# Patient Record
Sex: MALE | Race: WHITE | HISPANIC OR LATINO | Employment: FULL TIME | URBAN - METROPOLITAN AREA
[De-identification: names, ages, dates, MRNs, and addresses within clinical notes are randomized per-mention and may not be internally consistent; named-entity substitution may affect disease eponyms.]

---

## 2023-06-01 ENCOUNTER — CLINICAL SUPPORT (OUTPATIENT)
Dept: URGENT CARE | Facility: CLINIC | Age: 43
End: 2023-06-01
Payer: MEDICAID

## 2023-06-01 VITALS
DIASTOLIC BLOOD PRESSURE: 85 MMHG | HEART RATE: 77 BPM | WEIGHT: 235 LBS | HEIGHT: 69 IN | SYSTOLIC BLOOD PRESSURE: 165 MMHG | RESPIRATION RATE: 18 BRPM | BODY MASS INDEX: 34.8 KG/M2 | TEMPERATURE: 98 F | OXYGEN SATURATION: 98 %

## 2023-06-01 DIAGNOSIS — Z76.89 REFERRAL OF PATIENT: Primary | ICD-10-CM

## 2023-06-01 DIAGNOSIS — B35.6 JOCK ITCH: ICD-10-CM

## 2023-06-01 PROCEDURE — 99214 OFFICE O/P EST MOD 30 MIN: CPT | Mod: PBBFAC

## 2023-06-01 PROCEDURE — 99203 OFFICE O/P NEW LOW 30 MIN: CPT | Mod: S$PBB,,,

## 2023-06-01 PROCEDURE — 99203 PR OFFICE/OUTPT VISIT, NEW, LEVL III, 30-44 MIN: ICD-10-PCS | Mod: S$PBB,,,

## 2023-06-01 RX ORDER — NYSTATIN 100000 [USP'U]/G
POWDER TOPICAL 2 TIMES DAILY
Qty: 60 G | Refills: 0 | Status: SHIPPED | OUTPATIENT
Start: 2023-06-01

## 2023-06-02 NOTE — PROGRESS NOTES
"Subjective:      Patient ID: Rubio Cheek is a 43 y.o. male.    Vitals:  height is 5' 9" (1.753 m) and weight is 106.6 kg (235 lb). His oral temperature is 97.5 °F (36.4 °C). His blood pressure is 165/85 (abnormal) and his pulse is 77. His respiration is 18 and oxygen saturation is 98%.     Chief Complaint: Dizziness (Over 1 month), Fatigue (Over 1 month), and Referral (Referral for PCP)    PT states dizziness and fatigue for the past month. Works as an  in the heat all day. Denies chest pain, SOB or syncope. States some nausea and decreased appetite. Hx of HTN but has not been on BP medication in 3 years, needs referral to PCP. PT also states having a rash in her groin that itches from sweating.      Constitution: Positive for fatigue.   Neck: neck negative.   Cardiovascular: Negative.    Eyes: Negative.    Respiratory: Negative.     Gastrointestinal: Negative.    Genitourinary: Negative.         States pruritic red, rash in groin. Declined examination via female provider.   Musculoskeletal: Negative.    Skin: Negative.    Neurological:  Positive for dizziness.    Objective:     Physical Exam   Constitutional: He is oriented to person, place, and time.   HENT:   Head: Normocephalic.   Ears:   Right Ear: External ear and ear canal normal. impacted cerumen  Left Ear: External ear and ear canal normal. impacted cerumen  Nose: Nose normal.   Mouth/Throat: Uvula is midline, oropharynx is clear and moist and mucous membranes are normal. Mucous membranes are moist. Oropharynx is clear.   Eyes: Pupils are equal, round, and reactive to light.   Cardiovascular: Normal rate, regular rhythm, normal heart sounds and normal pulses.   Pulmonary/Chest: Effort normal and breath sounds normal.   Abdominal: Normal appearance. Soft.   Musculoskeletal: Normal range of motion.         General: Normal range of motion.   Neurological: He is alert and oriented to person, place, and time.   Skin: Skin is warm and dry. "   Vitals reviewed.    Assessment:     1. Referral of patient    2. Zachariah itch        Plan:       Referral of patient  -     Ambulatory referral/consult to Family Practice    Jock itch  -     nystatin (MYCOSTATIN) powder; Apply topically 2 (two) times daily.  Dispense: 60 g; Refill: 0    Stay hydrated with plenty of water and electrolyte beverages.    Take breaks in air condition often.    ER precautions given, patient verbalized understanding.     Please see provided patient education for guidance.    Follow up with PCP or return to clinic if symptoms worsen or do not improve.

## 2023-08-14 ENCOUNTER — OFFICE VISIT (OUTPATIENT)
Dept: FAMILY MEDICINE | Facility: CLINIC | Age: 43
End: 2023-08-14
Payer: MEDICAID

## 2023-08-14 VITALS
TEMPERATURE: 99 F | OXYGEN SATURATION: 99 % | SYSTOLIC BLOOD PRESSURE: 136 MMHG | HEIGHT: 70 IN | HEART RATE: 77 BPM | DIASTOLIC BLOOD PRESSURE: 93 MMHG | RESPIRATION RATE: 18 BRPM | WEIGHT: 229 LBS | BODY MASS INDEX: 32.78 KG/M2

## 2023-08-14 DIAGNOSIS — I10 PRIMARY HYPERTENSION: Primary | ICD-10-CM

## 2023-08-14 DIAGNOSIS — F32.9 REACTIVE DEPRESSION: ICD-10-CM

## 2023-08-14 DIAGNOSIS — Z12.12 ENCOUNTER FOR COLORECTAL CANCER SCREENING: ICD-10-CM

## 2023-08-14 DIAGNOSIS — R21 RASH: ICD-10-CM

## 2023-08-14 DIAGNOSIS — Z12.11 ENCOUNTER FOR COLORECTAL CANCER SCREENING: ICD-10-CM

## 2023-08-14 LAB
ANION GAP SERPL CALC-SCNC: 9 MEQ/L
BUN SERPL-MCNC: 12.3 MG/DL (ref 8.9–20.6)
CALCIUM SERPL-MCNC: 9.4 MG/DL (ref 8.4–10.2)
CHLORIDE SERPL-SCNC: 107 MMOL/L (ref 98–107)
CO2 SERPL-SCNC: 25 MMOL/L (ref 22–29)
CREAT SERPL-MCNC: 0.87 MG/DL (ref 0.73–1.18)
CREAT/UREA NIT SERPL: 14
GFR SERPLBLD CREATININE-BSD FMLA CKD-EPI: >60 MLS/MIN/1.73/M2
GLUCOSE SERPL-MCNC: 85 MG/DL (ref 74–100)
POTASSIUM SERPL-SCNC: 4.2 MMOL/L (ref 3.5–5.1)
SODIUM SERPL-SCNC: 141 MMOL/L (ref 136–145)

## 2023-08-14 PROCEDURE — 3008F PR BODY MASS INDEX (BMI) DOCUMENTED: ICD-10-PCS | Mod: CPTII,,, | Performed by: NURSE PRACTITIONER

## 2023-08-14 PROCEDURE — 1160F RVW MEDS BY RX/DR IN RCRD: CPT | Mod: CPTII,,, | Performed by: NURSE PRACTITIONER

## 2023-08-14 PROCEDURE — 99215 OFFICE O/P EST HI 40 MIN: CPT | Mod: S$PBB,,, | Performed by: NURSE PRACTITIONER

## 2023-08-14 PROCEDURE — 36415 COLL VENOUS BLD VENIPUNCTURE: CPT | Performed by: NURSE PRACTITIONER

## 2023-08-14 PROCEDURE — 3008F BODY MASS INDEX DOCD: CPT | Mod: CPTII,,, | Performed by: NURSE PRACTITIONER

## 2023-08-14 PROCEDURE — 4010F ACE/ARB THERAPY RXD/TAKEN: CPT | Mod: CPTII,,, | Performed by: NURSE PRACTITIONER

## 2023-08-14 PROCEDURE — 1160F PR REVIEW ALL MEDS BY PRESCRIBER/CLIN PHARMACIST DOCUMENTED: ICD-10-PCS | Mod: CPTII,,, | Performed by: NURSE PRACTITIONER

## 2023-08-14 PROCEDURE — 99215 PR OFFICE/OUTPT VISIT, EST, LEVL V, 40-54 MIN: ICD-10-PCS | Mod: S$PBB,,, | Performed by: NURSE PRACTITIONER

## 2023-08-14 PROCEDURE — 3080F DIAST BP >= 90 MM HG: CPT | Mod: CPTII,,, | Performed by: NURSE PRACTITIONER

## 2023-08-14 PROCEDURE — 3075F PR MOST RECENT SYSTOLIC BLOOD PRESS GE 130-139MM HG: ICD-10-PCS | Mod: CPTII,,, | Performed by: NURSE PRACTITIONER

## 2023-08-14 PROCEDURE — 1159F MED LIST DOCD IN RCRD: CPT | Mod: CPTII,,, | Performed by: NURSE PRACTITIONER

## 2023-08-14 PROCEDURE — 3075F SYST BP GE 130 - 139MM HG: CPT | Mod: CPTII,,, | Performed by: NURSE PRACTITIONER

## 2023-08-14 PROCEDURE — 1159F PR MEDICATION LIST DOCUMENTED IN MEDICAL RECORD: ICD-10-PCS | Mod: CPTII,,, | Performed by: NURSE PRACTITIONER

## 2023-08-14 PROCEDURE — 99215 OFFICE O/P EST HI 40 MIN: CPT | Mod: PBBFAC | Performed by: NURSE PRACTITIONER

## 2023-08-14 PROCEDURE — 3080F PR MOST RECENT DIASTOLIC BLOOD PRESSURE >= 90 MM HG: ICD-10-PCS | Mod: CPTII,,, | Performed by: NURSE PRACTITIONER

## 2023-08-14 PROCEDURE — 4010F PR ACE/ARB THEARPY RXD/TAKEN: ICD-10-PCS | Mod: CPTII,,, | Performed by: NURSE PRACTITIONER

## 2023-08-14 PROCEDURE — 80048 BASIC METABOLIC PNL TOTAL CA: CPT | Performed by: NURSE PRACTITIONER

## 2023-08-14 RX ORDER — TERBINAFINE HYDROCHLORIDE 250 MG/1
250 TABLET ORAL 2 TIMES DAILY
COMMUNITY
Start: 2023-06-15 | End: 2024-02-27 | Stop reason: ALTCHOICE

## 2023-08-14 RX ORDER — VALSARTAN 40 MG/1
40 TABLET ORAL DAILY
Qty: 30 TABLET | Refills: 1 | Status: SHIPPED | OUTPATIENT
Start: 2023-08-14 | End: 2023-09-11 | Stop reason: SDUPTHER

## 2023-08-14 NOTE — ASSESSMENT & PLAN NOTE
Patient state every once in a while when he wipes after having a bowel movement scant red blood is noted.  Patient state he usually have these issues when he is constipated.  Also patient state his older brother and few of his cousins had multiple polyps removal during their colonoscopy procedure.  Their Doctor has recommended for him to get a colonoscopy.  Discussed with patient to stay hydrated with water, increase fiber intake to 30 g per day if possible, stay physically active and continue to monitor rectal bleed.  Patient agreed to complete colorectal cancer screening referral.  Referral to Dr. Aleman has been initiated.

## 2023-08-14 NOTE — PROGRESS NOTES
Please notify patient regarding his blood work result.  Electrolytes including potassium sodium and chloride within normal range.  Kidney functions within normal range.  Keep appointment for follow-up in 4 weeks.  Return to clinic as needed.

## 2023-08-14 NOTE — ASSESSMENT & PLAN NOTE
Patient state has been having this rash to his left hand for over 3 months and he has been using over-the-counter work  Wart removal.  Discussed with patient to start using the topical medication.  Stop washing hands with hot water.  Start using topical Aquaphor ointment sparingly.  Will examined the rash in 4 weeks when you come back for blood pressure check.  Patient agreed to plan of care and verbalized understanding.

## 2023-08-14 NOTE — ASSESSMENT & PLAN NOTE
Patient state he has been having issue with his marriage relationship.  His wife state they have an appointment [facility].  Patient states some his symptom is having a hard time falling asleep com he has been wearing a lot.  Has 2 teenage kids who he worries about.  Patient declined initiating medication at this time.  Patient state he is going to see was going to happened with the appointment with [facility].  Patient will keep the clinic up-to-date.  Questions solicited and answered, patient verbalized and agreed to plan of care.

## 2023-08-14 NOTE — PROGRESS NOTES
Patient Name: Rubio Cheek   : 1980  MRN: [id number]     SUBJECTIVE DATA:    CHIEF COMPLAINT:   Rubio Cheek is a 43 y.o. male who presents to clinic today with Establish Care, GI Problem, and Fatigue        HPI:  43-year-old male presents to the clinic to establish care.  Past medical history of hypertension.  Patient is accompanied by his wife.  Patient declined [place] language line interpretation.    Hypertension:  Patient state about 5 years ago he used to be on lisinopril to manage his blood pressure.  For unknown reason he stopped taking his medication for the past 2 years.  Patient requesting to restart on blood pressure medication because he has not been feeling well, dizzy, fatigue.  Discussed with patient to stay physically active and exercise at least 30 minutes a day up to 5 days a week as simple as brisk walking, stay hydrated with water, follow low-salt diet less than 2 g per day also discussed initiating Rx diovan 40 mg p.o. once daily.  Patient to return to clinic in 4 weeks for routine follow-up on his blood pressure, questions solicited and answered, patient verbalized and agreed to plan of care.    Depression:  Patient state he has been having issue with his marriage relationship.  His wife state they have an appointment today at [facility].  Patient states some his symptom is having a hard time falling asleep com he has been wearing a lot.  Has 2 teenage kids who he worries about.  Patient declined initiating medication at this time.  Patient state he is going to see was going to happened with the appointment with [facility].  Patient will keep the clinic up-to-date.  Questions solicited and answered, patient verbalized and agreed to plan of care.     Rash to left hand:  Patient state has been having this rash to his left hand for over 3 months and he has been using over-the-counter work  Wart removal.  Discussed with patient to start using the topical  "medication.  Stop washing hands with hot water.  Start using topical Aquaphor ointment sparingly.  Will examined the rash in 4 weeks when you come back for blood pressure check.  Patient agreed to plan of care and verbalized understanding.    Gastrointestinal issue:  Patient state every once in a while when he wipes after having a bowel movement scant red blood is noted.  Patient state he usually have these issues when he is constipated.  Also patient state his older brother and few of his cousins had multiple polyps removal during their colonoscopy procedure.  Their Doctor has recommended for him to get a colonoscopy.  Discussed with patient to stay hydrated with water, increase fiber intake to 30 g per day if possible, stay physically active and continue to monitor rectal bleed.  Patient agreed to complete colorectal cancer screening referral.  Referral to Dr. Aleman has been initiated.    Patient denies chest pain, shortness of breath, dyspnea on exertion, palpitations, peripheral edema, abdominal pain, nausea, vomiting, diarrhea, constipation, fatigue, fever, chills, dysuria,  hematuria, melena, or hematochezia.       ALLERGIES: Review of patient's allergies indicates:  No Known Allergies      ROS:  Review of Systems   Constitutional:  Positive for malaise/fatigue.   Gastrointestinal:  Positive for blood in stool.   Skin:  Positive for rash.   Neurological:  Positive for dizziness and headaches.   Psychiatric/Behavioral:  Positive for depression.    All other systems reviewed and are negative.        OBJECTIVE DATA:  Vital signs  Vitals:    08/14/23 1007 08/14/23 1053   BP: (!) 142/94 (!) 136/93   Pulse: 77    Resp: 18    Temp: 98.6 °F (37 °C)    TempSrc: Oral    SpO2: 99%    Weight: 103.9 kg (229 lb)    Height: 5' 10" (1.778 m)       Body mass index is 32.86 kg/m².    PHYSICAL EXAM:   Physical Exam  Vitals and nursing note reviewed.   Constitutional:       General: He is awake. He is not in acute distress.     " Appearance: Normal appearance. He is well-developed, well-groomed and overweight. He is not ill-appearing, toxic-appearing or diaphoretic.   HENT:      Head: Normocephalic and atraumatic.      Right Ear: Tympanic membrane, ear canal and external ear normal. There is impacted cerumen.      Left Ear: Tympanic membrane, ear canal and external ear normal. There is impacted cerumen.      Nose: Nose normal. No congestion or rhinorrhea.      Mouth/Throat:      Lips: Pink.      Mouth: Mucous membranes are moist.      Tongue: No lesions. Tongue does not deviate from midline.      Palate: No mass and lesions.      Pharynx: Oropharynx is clear. Uvula midline. No posterior oropharyngeal erythema.   Eyes:      General: Lids are normal. No scleral icterus.     Extraocular Movements: Extraocular movements intact.      Conjunctiva/sclera: Conjunctivae normal.      Pupils: Pupils are equal, round, and reactive to light.   Neck:      Trachea: Trachea and phonation normal.   Cardiovascular:      Rate and Rhythm: Normal rate and regular rhythm.      Pulses: Normal pulses.           Radial pulses are 2+ on the right side and 2+ on the left side.      Heart sounds: Normal heart sounds. No murmur heard.  Pulmonary:      Effort: Pulmonary effort is normal.      Breath sounds: Normal breath sounds and air entry.   Abdominal:      General: Bowel sounds are normal.      Palpations: Abdomen is soft.      Tenderness: There is no abdominal tenderness. There is no right CVA tenderness or left CVA tenderness.   Musculoskeletal:         General: Normal range of motion.      Cervical back: Normal range of motion and neck supple. No rigidity or tenderness.      Right lower leg: No edema.      Left lower leg: No edema.   Lymphadenopathy:      Cervical: No cervical adenopathy.      Right cervical: No superficial cervical adenopathy.     Left cervical: No superficial cervical adenopathy.   Skin:     General: Skin is warm.      Capillary Refill:  Capillary refill takes less than 2 seconds.      Findings: Rash present. Rash is crusting.          Neurological:      General: No focal deficit present.      Mental Status: He is alert and oriented to person, place, and time. Mental status is at baseline.      GCS: GCS eye subscore is 4. GCS verbal subscore is 5. GCS motor subscore is 6.      Gait: Gait normal.   Psychiatric:         Attention and Perception: Attention and perception normal.         Mood and Affect: Affect normal. Mood is anxious.         Speech: Speech normal.         Behavior: Behavior normal. Behavior is cooperative.         Thought Content: Thought content normal. Thought content does not include homicidal or suicidal ideation.         Cognition and Memory: Cognition and memory normal.         Judgment: Judgment normal.          ASSESSMENT/PLAN:  1. Primary hypertension  Assessment & Plan:  Discussed with patient to stay physically active and exercise at least 30 minutes a day up to 5 days a week as simple as brisk walking, stay hydrated with water, follow low-salt diet less than 2 g per day also discussed initiating Rx diovan 40 mg p.o. once daily.  Patient to return to clinic in 4 weeks for routine follow-up on his blood pressure, questions solicited and answered, patient verbalized and agreed to plan of care.    Orders:  -     valsartan (DIOVAN) 40 MG tablet; Take 1 tablet (40 mg total) by mouth once daily.  Dispense: 30 tablet; Refill: 1  -     Basic Metabolic Panel    2. Reactive depression  Assessment & Plan:   Patient state he has been having issue with his marriage relationship.  His wife state they have an appointment today at [facility].  Patient states some his symptom is having a hard time falling asleep com he has been wearing a lot.  Has 2 teenage kids who he worries about.  Patient declined initiating medication at this time.  Patient state he is going to see was going to happened with the appointment with [facility]  counseling.  Patient will keep the clinic up-to-date.  Questions solicited and answered, patient verbalized and agreed to plan of care.       3. Rash  Assessment & Plan:   Patient state has been having this rash to his left hand for over 3 months and he has been using over-the-counter work  Wart removal.  Discussed with patient to start using the topical medication.  Stop washing hands with hot water.  Start using topical Aquaphor ointment sparingly.  Will examined the rash in 4 weeks when you come back for blood pressure check.  Patient agreed to plan of care and verbalized understanding.      4. Encounter for colorectal cancer screening  Assessment & Plan:  Patient state every once in a while when he wipes after having a bowel movement scant red blood is noted.  Patient state he usually have these issues when he is constipated.  Also patient state his older brother and few of his cousins had multiple polyps removal during their colonoscopy procedure.  Their Doctor has recommended for him to get a colonoscopy.  Discussed with patient to stay hydrated with water, increase fiber intake to 30 g per day if possible, stay physically active and continue to monitor rectal bleed.  Patient agreed to complete colorectal cancer screening referral.  Referral to Dr. Aleman has been initiated.    Orders:  -     Ambulatory referral/consult to Gastroenterology; Future; Expected date: 08/21/2023           RESULTS:  Recent Results (from the past 1008 hour(s))   Basic Metabolic Panel    Collection Time: 08/14/23 10:56 AM   Result Value Ref Range    Sodium Level 141 136 - 145 mmol/L    Potassium Level 4.2 3.5 - 5.1 mmol/L    Chloride 107 98 - 107 mmol/L    Carbon Dioxide 25 22 - 29 mmol/L    Glucose Level 85 74 - 100 mg/dL    Blood Urea Nitrogen 12.3 8.9 - 20.6 mg/dL    Creatinine 0.87 0.73 - 1.18 mg/dL    BUN/Creatinine Ratio 14     Calcium Level Total 9.4 8.4 - 10.2 mg/dL    Anion Gap 9.0 mEq/L    eGFR >60 mls/min/1.73/m2          Follow Up:  Follow up in about 4 weeks (around 9/11/2023).     Face to face time 45 minutes, including documentation, chart review, counseling, education, review of test results, relevant medical records, and coordination of care.   I have explained the treatment plan, diagnosis, and prognosis to patient. All questions are answered to the best of my knowledge.     Previous medical history/lab work/radiology reviewed and considered during medical management decisions.   Medication list reviewed and medication reconciliation performed.  Patient was provided  and care about his/her current diagnosis (es) and medications including risk/benefit and side effects/adverse events, over the counter medication uses/doses, home self-care and contact precautions,  and red flags and indications for when to seek immediate medical attention.   Patient was advised to continue compliance with current medication list and medical recommendations.  Patient dvised continued compliance with recommended eating habits/ diets for medical conditions and exercise 150 minutes/ week (if possible) for medical condition (s).  Educational handouts and instructions on selected disease management in AVS (After Visit Summary).    All of the patient's questions were answered to patient's satisfaction.   The patient was receptive, expressed verbal understanding and agreement the above plan.     This note was created with the assistance of a voice recognition software or phone dictation. There may be transcription errors as a result of using this technology however minimal. Effort has been made to assure accuracy of transcription but any obvious errors or omissions should be clarified with the author of the document

## 2023-08-14 NOTE — ASSESSMENT & PLAN NOTE
Discussed with patient to stay physically active and exercise at least 30 minutes a day up to 5 days a week as simple as brisk walking, stay hydrated with water, follow low-salt diet less than 2 g per day also discussed initiating Rx diovan 40 mg p.o. once daily.  Patient to return to clinic in 4 weeks for routine follow-up on his blood pressure, questions solicited and answered, patient verbalized and agreed to plan of care.

## 2023-08-15 ENCOUNTER — TELEPHONE (OUTPATIENT)
Dept: FAMILY MEDICINE | Facility: CLINIC | Age: 43
End: 2023-08-15
Payer: MEDICAID

## 2023-08-15 NOTE — TELEPHONE ENCOUNTER
----- Message from MADDY Egan sent at 8/14/2023  3:28 PM CDT -----  Please notify patient regarding his blood work result.  Electrolytes including potassium sodium and chloride within normal range.  Kidney functions within normal range.  Keep appointment for follow-up in 4 weeks.  Return to clinic as needed.

## 2023-09-11 ENCOUNTER — OFFICE VISIT (OUTPATIENT)
Dept: FAMILY MEDICINE | Facility: CLINIC | Age: 43
End: 2023-09-11
Payer: MEDICAID

## 2023-09-11 VITALS
OXYGEN SATURATION: 98 % | RESPIRATION RATE: 18 BRPM | WEIGHT: 225 LBS | DIASTOLIC BLOOD PRESSURE: 83 MMHG | HEART RATE: 74 BPM | BODY MASS INDEX: 32.21 KG/M2 | HEIGHT: 70 IN | SYSTOLIC BLOOD PRESSURE: 127 MMHG | TEMPERATURE: 98 F

## 2023-09-11 DIAGNOSIS — Z23 ENCOUNTER FOR VACCINATION: ICD-10-CM

## 2023-09-11 DIAGNOSIS — I10 PRIMARY HYPERTENSION: Primary | ICD-10-CM

## 2023-09-11 DIAGNOSIS — R10.84 ABDOMINAL CRAMPING, GENERALIZED: ICD-10-CM

## 2023-09-11 DIAGNOSIS — N52.8 OTHER MALE ERECTILE DYSFUNCTION: ICD-10-CM

## 2023-09-11 PROCEDURE — 90471 IMMUNIZATION ADMIN: CPT | Mod: PBBFAC

## 2023-09-11 PROCEDURE — 4010F PR ACE/ARB THEARPY RXD/TAKEN: ICD-10-PCS | Mod: CPTII,,, | Performed by: NURSE PRACTITIONER

## 2023-09-11 PROCEDURE — 3079F DIAST BP 80-89 MM HG: CPT | Mod: CPTII,,, | Performed by: NURSE PRACTITIONER

## 2023-09-11 PROCEDURE — 3008F PR BODY MASS INDEX (BMI) DOCUMENTED: ICD-10-PCS | Mod: CPTII,,, | Performed by: NURSE PRACTITIONER

## 2023-09-11 PROCEDURE — 1160F PR REVIEW ALL MEDS BY PRESCRIBER/CLIN PHARMACIST DOCUMENTED: ICD-10-PCS | Mod: CPTII,,, | Performed by: NURSE PRACTITIONER

## 2023-09-11 PROCEDURE — 1160F RVW MEDS BY RX/DR IN RCRD: CPT | Mod: CPTII,,, | Performed by: NURSE PRACTITIONER

## 2023-09-11 PROCEDURE — 99214 PR OFFICE/OUTPT VISIT, EST, LEVL IV, 30-39 MIN: ICD-10-PCS | Mod: S$PBB,,, | Performed by: NURSE PRACTITIONER

## 2023-09-11 PROCEDURE — 3074F PR MOST RECENT SYSTOLIC BLOOD PRESSURE < 130 MM HG: ICD-10-PCS | Mod: CPTII,,, | Performed by: NURSE PRACTITIONER

## 2023-09-11 PROCEDURE — 4010F ACE/ARB THERAPY RXD/TAKEN: CPT | Mod: CPTII,,, | Performed by: NURSE PRACTITIONER

## 2023-09-11 PROCEDURE — 3074F SYST BP LT 130 MM HG: CPT | Mod: CPTII,,, | Performed by: NURSE PRACTITIONER

## 2023-09-11 PROCEDURE — 99214 OFFICE O/P EST MOD 30 MIN: CPT | Mod: S$PBB,,, | Performed by: NURSE PRACTITIONER

## 2023-09-11 PROCEDURE — 3079F PR MOST RECENT DIASTOLIC BLOOD PRESSURE 80-89 MM HG: ICD-10-PCS | Mod: CPTII,,, | Performed by: NURSE PRACTITIONER

## 2023-09-11 PROCEDURE — 90686 IIV4 VACC NO PRSV 0.5 ML IM: CPT | Mod: PBBFAC

## 2023-09-11 PROCEDURE — 1159F PR MEDICATION LIST DOCUMENTED IN MEDICAL RECORD: ICD-10-PCS | Mod: CPTII,,, | Performed by: NURSE PRACTITIONER

## 2023-09-11 PROCEDURE — 1159F MED LIST DOCD IN RCRD: CPT | Mod: CPTII,,, | Performed by: NURSE PRACTITIONER

## 2023-09-11 PROCEDURE — 3008F BODY MASS INDEX DOCD: CPT | Mod: CPTII,,, | Performed by: NURSE PRACTITIONER

## 2023-09-11 PROCEDURE — 99214 OFFICE O/P EST MOD 30 MIN: CPT | Mod: PBBFAC | Performed by: NURSE PRACTITIONER

## 2023-09-11 RX ORDER — VALSARTAN 40 MG/1
40 TABLET ORAL DAILY
Qty: 90 TABLET | Refills: 3 | Status: SHIPPED | OUTPATIENT
Start: 2023-09-11

## 2023-09-11 RX ORDER — TADALAFIL 10 MG/1
10 TABLET ORAL DAILY PRN
Qty: 30 TABLET | Refills: 1 | Status: SHIPPED | OUTPATIENT
Start: 2023-09-11

## 2023-09-11 RX ORDER — NYSTATIN AND TRIAMCINOLONE ACETONIDE 100000; 1 [USP'U]/G; MG/G
1 CREAM TOPICAL 2 TIMES DAILY
COMMUNITY
Start: 2023-08-14

## 2023-09-11 RX ORDER — OMEPRAZOLE 40 MG/1
40 CAPSULE, DELAYED RELEASE ORAL EVERY MORNING
Qty: 30 CAPSULE | Refills: 1 | Status: SHIPPED | OUTPATIENT
Start: 2023-09-11

## 2023-09-11 RX ADMIN — INFLUENZA VIRUS VACCINE 0.5 ML: 15; 15; 15; 15 SUSPENSION INTRAMUSCULAR at 03:09

## 2023-09-11 NOTE — ASSESSMENT & PLAN NOTE
Patient state lately he has been having issue with his erection.  At times he can having erections but unable to keep erection.  From previous visit patient does have marriage issues ,and currently he is on blood pressure medication.  Discussed with patient stress can cause the issue.  Blood pressure medication as well can cause erectile dysfunction.  Discussed with patient as him and his wife get the manage and relationship better with counseling sessions the symptom should improve.  Also discussed with patient to establish a schedule for him and the family where he can have time for himself to relax or maybe exercise or take his wife to eat etcetera.  Discussed exercising relief stress and change his lifestyle and healthy food choices.  Get plenty of rest and stay hydrated with water.  Discussed with patient Rx Cialis 10 mg p.o. 30-60 minutes prior to sex.  Discussed side effect.  Questions solicited and answered, patient to read discharge education material.  Discussed with patient to upload the good Rx for better price.  Patient verbalized.

## 2023-09-11 NOTE — ASSESSMENT & PLAN NOTE
Patient state he has been having some cramping burning sensation to abdomen.  Patient usually has the issue after eating.  Patient state he does not have the best diet.  [place] diet his original native food.  Discussed GERD diet.  Discussed with patient will try omeprazole 40 mg p.o. once daily in the morning on empty stomach 30 minutes before 1st meal.  Discussed dietary modifications.  Discussed with patient when he go see his gastroenterologist on the 27th of September to discuss the symptoms and see what he would say.  Questions solicited and answered, patient verbalized and agreed to plan of care.

## 2023-09-11 NOTE — ASSESSMENT & PLAN NOTE
Current blood pressure  127/83 .  Patient currently takes Diovan 40 mg p.o. once daily.  Discussed with patient will continue with current medication regiment.  Continue to follow low-salt diet less than 2 g per day, stay physically active, exercise at least 30 minutes a day up to 5 days a week a simple as brisk walking, stay hydrated with water.  Medication refilled.  Questions solicited and answered, patient verbalized.

## 2023-09-11 NOTE — PROGRESS NOTES
Patient Name: Rubio Cheek   : 1980  MRN: [id number]     SUBJECTIVE DATA:    CHIEF COMPLAINT:   Rubio Cheek is a 43 y.o. male who presents to clinic today with Hypertension      HPI:  43-year-old male presents to the clinic to follow-up on hypertension.    Hypertension:  Current blood pressure  127/83 .  Patient currently takes Diovan 40 mg p.o. once daily.  Discussed with patient will continue with current medication regiment.  Continue to follow low-salt diet less than 2 g per day, stay physically active, exercise at least 30 minutes a day up to 5 days a week a simple as brisk walking, stay hydrated with water.  Medication refilled.  Questions solicited and answered, patient verbalized. BUN AND Creatinine 12.3, 0.87.    Stomach issues:  Patient state he has been having some cramping burning sensation to abdomen.  Patient usually has the issue after eating.  Patient state he does not have the best diet.  Mexican diet his original native food.  Discussed GERD diet.  Discussed with patient will try omeprazole 40 mg p.o. once daily in the morning on empty stomach 30 minutes before 1st meal.  Discussed dietary modifications.  Discussed with patient when he go see his gastroenterologist on the  to discuss the symptoms and see what he would say.  Questions solicited and answered, patient verbalized and agreed to plan of care.    Erectile dysfunction:  Patient state lately he has been having issue with his erection.  At times he can having erections but unable to keep erection.  From previous visit patient does have marriage issues ,and currently he is on blood pressure medication.  Discussed with patient stress can cause the issue.  Blood pressure medication as well can cause erectile dysfunction.  Discussed with patient as him and his wife get the manage and relationship better with counseling sessions the symptom should improve.  Also discussed with patient to establish a schedule for  "him and the family where he can have time for himself to relax or maybe exercise or take his wife to eat etcetera.  Discussed exercising relief stress and change his lifestyle and healthy food choices.  Get plenty of rest and stay hydrated with water.  Discussed with patient Rx Cialis 10 mg p.o. 30-60 minutes prior to sex.  Discussed side effect.  Questions solicited and answered, patient to read discharge education material.  Discussed with patient to upload the good Rx for better price.  Patient verbalized.    Care gaps:  Patient agreed to receive flu vaccine.    Patient denies chest pain, shortness of breath, dyspnea on exertion, palpitations, peripheral edema, abdominal pain, nausea, vomiting, diarrhea, constipation, fatigue, fever, chills, dysuria,  hematuria, melena, or hematochezia.     ALLERGIES: Review of patient's allergies indicates:  No Known Allergies      ROS:  Review of Systems   Gastrointestinal:  Positive for abdominal pain and heartburn.   Genitourinary:         Erectile issues.   All other systems reviewed and are negative.        OBJECTIVE DATA:  Vital signs  Vitals:    09/11/23 1505   BP: 127/83   Pulse: 74   Resp: 18   Temp: 98.1 °F (36.7 °C)   TempSrc: Oral   SpO2: 98%   Weight: 102.1 kg (225 lb)   Height: 5' 10" (1.778 m)      Body mass index is 32.28 kg/m².    PHYSICAL EXAM:   Physical Exam  Constitutional:       General: He is awake.      Appearance: Normal appearance. He is well-developed, well-groomed and overweight.   HENT:      Head: Normocephalic and atraumatic.      Right Ear: Tympanic membrane, ear canal and external ear normal.      Left Ear: Tympanic membrane, ear canal and external ear normal.      Nose: Nose normal.      Mouth/Throat:      Mouth: Mucous membranes are moist.      Pharynx: Oropharynx is clear. Uvula midline.   Eyes:      General: Lids are normal.      Extraocular Movements: Extraocular movements intact.      Conjunctiva/sclera: Conjunctivae normal.      Pupils: " Pupils are equal, round, and reactive to light.   Neck:      Trachea: Trachea and phonation normal.   Cardiovascular:      Rate and Rhythm: Normal rate and regular rhythm.      Pulses: Normal pulses.           Radial pulses are 2+ on the right side and 2+ on the left side.      Heart sounds: Normal heart sounds.   Pulmonary:      Effort: Pulmonary effort is normal.      Breath sounds: Normal breath sounds and air entry.   Abdominal:      General: Abdomen is flat. Bowel sounds are normal. There is no distension.      Palpations: Abdomen is soft. There is no mass.      Tenderness: There is abdominal tenderness in the epigastric area. There is no right CVA tenderness, left CVA tenderness, guarding or rebound.      Hernia: No hernia is present.       Musculoskeletal:         General: Normal range of motion.      Cervical back: Normal range of motion.      Right lower leg: No edema.      Left lower leg: No edema.   Skin:     General: Skin is warm.      Capillary Refill: Capillary refill takes less than 2 seconds.   Neurological:      General: No focal deficit present.      Mental Status: He is alert and oriented to person, place, and time. Mental status is at baseline.      GCS: GCS eye subscore is 4. GCS verbal subscore is 5. GCS motor subscore is 6.   Psychiatric:         Attention and Perception: Attention and perception normal.         Mood and Affect: Mood and affect normal.         Speech: Speech normal.         Behavior: Behavior normal. Behavior is cooperative.         Thought Content: Thought content normal.         Cognition and Memory: Cognition and memory normal.         Judgment: Judgment normal.          ASSESSMENT/PLAN:  1. Primary hypertension  Assessment & Plan:  Current blood pressure  127/83 .  Patient currently takes Diovan 40 mg p.o. once daily.  Discussed with patient will continue with current medication regiment.  Continue to follow low-salt diet less than 2 g per day, stay physically active,  exercise at least 30 minutes a day up to 5 days a week a simple as brisk walking, stay hydrated with water.  Medication refilled.  Questions solicited and answered, patient verbalized.    Orders:  -     valsartan (DIOVAN) 40 MG tablet; Take 1 tablet (40 mg total) by mouth once daily.  Dispense: 90 tablet; Refill: 3    2. Abdominal cramping, generalized  Assessment & Plan:  Patient state he has been having some cramping burning sensation to abdomen.  Patient usually has the issue after eating.  Patient state he does not have the best diet.  Mexican diet his original native food.  Discussed GERD diet.  Discussed with patient will try omeprazole 40 mg p.o. once daily in the morning on empty stomach 30 minutes before 1st meal.  Discussed dietary modifications.  Discussed with patient when he go see his gastroenterologist on the 27th of September to discuss the symptoms and see what he would say.  Questions solicited and answered, patient verbalized and agreed to plan of care.    Orders:  -     omeprazole (PRILOSEC) 40 MG capsule; Take 1 capsule (40 mg total) by mouth every morning.  Dispense: 30 capsule; Refill: 1    3. Other male erectile dysfunction  Assessment & Plan:  Patient state lately he has been having issue with his erection.  At times he can having erections but unable to keep erection.  From previous visit patient does have marriage issues ,and currently he is on blood pressure medication.  Discussed with patient stress can cause the issue.  Blood pressure medication as well can cause erectile dysfunction.  Discussed with patient as him and his wife get the manage and relationship better with counseling sessions the symptom should improve.  Also discussed with patient to establish a schedule for him and the family where he can have time for himself to relax or maybe exercise or take his wife to eat etcetera.  Discussed exercising relief stress and change his lifestyle and healthy food choices.  Get plenty of  rest and stay hydrated with water.  Discussed with patient Rx Cialis 10 mg p.o. 30-60 minutes prior to sex.  Discussed side effect.  Questions solicited and answered, patient to read discharge education material.  Discussed with patient to upload the good Rx for better price.  Patient verbalized.    Orders:  -     tadalafiL (CIALIS) 10 MG tablet; Take 1 tablet (10 mg total) by mouth daily as needed for Erectile Dysfunction. 30-60 min prior to sex.  Dispense: 30 tablet; Refill: 1    4. Encounter for vaccination  -     influenza (QUADRIVALENT PF) vaccine 0.5 mL           RESULTS:  Recent Results (from the past 1008 hour(s))   Basic Metabolic Panel    Collection Time: 08/14/23 10:56 AM   Result Value Ref Range    Sodium Level 141 136 - 145 mmol/L    Potassium Level 4.2 3.5 - 5.1 mmol/L    Chloride 107 98 - 107 mmol/L    Carbon Dioxide 25 22 - 29 mmol/L    Glucose Level 85 74 - 100 mg/dL    Blood Urea Nitrogen 12.3 8.9 - 20.6 mg/dL    Creatinine 0.87 0.73 - 1.18 mg/dL    BUN/Creatinine Ratio 14     Calcium Level Total 9.4 8.4 - 10.2 mg/dL    Anion Gap 9.0 mEq/L    eGFR >60 mls/min/1.73/m2         Follow Up:  Follow up in about 3 months (around 12/11/2023).     Face to face time 30  minutes, including documentation, chart review, counseling, education, review of test results, relevant medical records, and coordination of care.   I have explained the treatment plan, diagnosis, and prognosis to patient. All questions are answered to the best of my knowledge.     Previous medical history/lab work/radiology reviewed and considered during medical management decisions.   Medication list reviewed and medication reconciliation performed.  Patient was provided  and care about his/her current diagnosis (es) and medications including risk/benefit and side effects/adverse events, over the counter medication uses/doses, home self-care and contact precautions,  and red flags and indications for when to seek immediate medical  attention.   Patient was advised to continue compliance with current medication list and medical recommendations.  Patient dvised continued compliance with recommended eating habits/ diets for medical conditions and exercise 150 minutes/ week (if possible) for medical condition (s).  Educational handouts and instructions on selected disease management in AVS (After Visit Summary).    All of the patient's questions were answered to patient's satisfaction.   The patient was receptive, expressed verbal understanding and agreement the above plan.     This note was created with the assistance of a voice recognition software or phone dictation. There may be transcription errors as a result of using this technology however minimal. Effort has been made to assure accuracy of transcription but any obvious errors or omissions should be clarified with the author of the document

## 2023-11-08 LAB — CRC RECOMMENDATION EXT: NORMAL

## 2024-02-08 ENCOUNTER — LAB VISIT (OUTPATIENT)
Dept: LAB | Facility: HOSPITAL | Age: 44
End: 2024-02-08
Attending: NURSE PRACTITIONER
Payer: MEDICAID

## 2024-02-08 DIAGNOSIS — K21.00 REFLUX ESOPHAGITIS: Primary | ICD-10-CM

## 2024-02-08 DIAGNOSIS — K25.9 ESOPHAGOGASTRIC ULCER, UNSPECIFIED ULCER CHRONICITY: ICD-10-CM

## 2024-02-08 DIAGNOSIS — K64.1 SECOND DEGREE HEMORRHOIDS: ICD-10-CM

## 2024-02-08 DIAGNOSIS — K25.9 GASTRIC ULCER DUE TO HELICOBACTER PYLORI, UNSPECIFIED CHRONICITY: ICD-10-CM

## 2024-02-08 DIAGNOSIS — B96.81 GASTRIC ULCER DUE TO HELICOBACTER PYLORI, UNSPECIFIED CHRONICITY: ICD-10-CM

## 2024-02-08 LAB — H. PYLORI STOOL: NEGATIVE

## 2024-02-08 PROCEDURE — 87338 HPYLORI STOOL AG IA: CPT

## 2024-02-27 ENCOUNTER — OFFICE VISIT (OUTPATIENT)
Dept: FAMILY MEDICINE | Facility: CLINIC | Age: 44
End: 2024-02-27
Payer: MEDICAID

## 2024-02-27 VITALS
BODY MASS INDEX: 32.35 KG/M2 | HEIGHT: 70 IN | HEART RATE: 63 BPM | TEMPERATURE: 98 F | OXYGEN SATURATION: 98 % | RESPIRATION RATE: 18 BRPM | WEIGHT: 226 LBS | SYSTOLIC BLOOD PRESSURE: 129 MMHG | DIASTOLIC BLOOD PRESSURE: 83 MMHG

## 2024-02-27 DIAGNOSIS — I10 PRIMARY HYPERTENSION: ICD-10-CM

## 2024-02-27 DIAGNOSIS — M25.531 RIGHT WRIST PAIN: ICD-10-CM

## 2024-02-27 DIAGNOSIS — Z00.00 ANNUAL PHYSICAL EXAM: Primary | ICD-10-CM

## 2024-02-27 LAB
ALBUMIN SERPL-MCNC: 4.3 G/DL (ref 3.5–5)
ALBUMIN/GLOB SERPL: 1.6 RATIO (ref 1.1–2)
ALP SERPL-CCNC: 70 UNIT/L (ref 40–150)
ALT SERPL-CCNC: 48 UNIT/L (ref 0–55)
APPEARANCE UR: CLEAR
AST SERPL-CCNC: 21 UNIT/L (ref 5–34)
BACTERIA #/AREA URNS AUTO: ABNORMAL /HPF
BASOPHILS # BLD AUTO: 0.03 X10(3)/MCL
BASOPHILS NFR BLD AUTO: 0.7 %
BILIRUB SERPL-MCNC: 0.3 MG/DL
BILIRUB UR QL STRIP.AUTO: NEGATIVE
BUN SERPL-MCNC: 14.8 MG/DL (ref 8.9–20.6)
CALCIUM SERPL-MCNC: 9.1 MG/DL (ref 8.4–10.2)
CHLORIDE SERPL-SCNC: 109 MMOL/L (ref 98–107)
CHOLEST SERPL-MCNC: 168 MG/DL
CHOLEST/HDLC SERPL: 4 {RATIO} (ref 0–5)
CO2 SERPL-SCNC: 25 MMOL/L (ref 22–29)
COLOR UR AUTO: ABNORMAL
CREAT SERPL-MCNC: 0.83 MG/DL (ref 0.73–1.18)
EOSINOPHIL # BLD AUTO: 0.36 X10(3)/MCL (ref 0–0.9)
EOSINOPHIL NFR BLD AUTO: 8.3 %
ERYTHROCYTE [DISTWIDTH] IN BLOOD BY AUTOMATED COUNT: 12.2 % (ref 11.5–17)
EST. AVERAGE GLUCOSE BLD GHB EST-MCNC: 105.4 MG/DL
GFR SERPLBLD CREATININE-BSD FMLA CKD-EPI: >60 MLS/MIN/1.73/M2
GLOBULIN SER-MCNC: 2.7 GM/DL (ref 2.4–3.5)
GLUCOSE SERPL-MCNC: 82 MG/DL (ref 74–100)
GLUCOSE UR QL STRIP.AUTO: NORMAL
HBA1C MFR BLD: 5.3 %
HCT VFR BLD AUTO: 43.2 % (ref 42–52)
HCV AB SERPL QL IA: NONREACTIVE
HDLC SERPL-MCNC: 38 MG/DL (ref 35–60)
HGB BLD-MCNC: 14.7 G/DL (ref 14–18)
HIV 1+2 AB+HIV1 P24 AG SERPL QL IA: NONREACTIVE
HYALINE CASTS #/AREA URNS LPF: ABNORMAL /LPF
IMM GRANULOCYTES # BLD AUTO: 0.02 X10(3)/MCL (ref 0–0.04)
IMM GRANULOCYTES NFR BLD AUTO: 0.5 %
KETONES UR QL STRIP.AUTO: NEGATIVE
LDLC SERPL CALC-MCNC: 108 MG/DL (ref 50–140)
LEUKOCYTE ESTERASE UR QL STRIP.AUTO: NEGATIVE
LYMPHOCYTES # BLD AUTO: 1.48 X10(3)/MCL (ref 0.6–4.6)
LYMPHOCYTES NFR BLD AUTO: 34.1 %
MCH RBC QN AUTO: 28.2 PG (ref 27–31)
MCHC RBC AUTO-ENTMCNC: 34 G/DL (ref 33–36)
MCV RBC AUTO: 82.9 FL (ref 80–94)
MONOCYTES # BLD AUTO: 0.38 X10(3)/MCL (ref 0.1–1.3)
MONOCYTES NFR BLD AUTO: 8.8 %
MUCOUS THREADS URNS QL MICRO: ABNORMAL /LPF
NEUTROPHILS # BLD AUTO: 2.07 X10(3)/MCL (ref 2.1–9.2)
NEUTROPHILS NFR BLD AUTO: 47.6 %
NITRITE UR QL STRIP.AUTO: NEGATIVE
NRBC BLD AUTO-RTO: 0 %
PH UR STRIP.AUTO: 5 [PH]
PLATELET # BLD AUTO: 316 X10(3)/MCL (ref 130–400)
PMV BLD AUTO: 9.4 FL (ref 7.4–10.4)
POTASSIUM SERPL-SCNC: 4 MMOL/L (ref 3.5–5.1)
PROT SERPL-MCNC: 7 GM/DL (ref 6.4–8.3)
PROT UR QL STRIP.AUTO: NEGATIVE
RBC # BLD AUTO: 5.21 X10(6)/MCL (ref 4.7–6.1)
RBC #/AREA URNS AUTO: ABNORMAL /HPF
RBC UR QL AUTO: NEGATIVE
SODIUM SERPL-SCNC: 142 MMOL/L (ref 136–145)
SP GR UR STRIP.AUTO: 1.03 (ref 1–1.03)
SQUAMOUS #/AREA URNS LPF: ABNORMAL /HPF
TRIGL SERPL-MCNC: 110 MG/DL (ref 34–140)
TSH SERPL-ACNC: 1.2 UIU/ML (ref 0.35–4.94)
UROBILINOGEN UR STRIP-ACNC: NORMAL
VLDLC SERPL CALC-MCNC: 22 MG/DL
WBC # SPEC AUTO: 4.34 X10(3)/MCL (ref 4.5–11.5)
WBC #/AREA URNS AUTO: ABNORMAL /HPF

## 2024-02-27 PROCEDURE — 99396 PREV VISIT EST AGE 40-64: CPT | Mod: S$PBB,,, | Performed by: NURSE PRACTITIONER

## 2024-02-27 PROCEDURE — 36415 COLL VENOUS BLD VENIPUNCTURE: CPT | Performed by: NURSE PRACTITIONER

## 2024-02-27 PROCEDURE — 3079F DIAST BP 80-89 MM HG: CPT | Mod: CPTII,,, | Performed by: NURSE PRACTITIONER

## 2024-02-27 PROCEDURE — 80061 LIPID PANEL: CPT | Performed by: NURSE PRACTITIONER

## 2024-02-27 PROCEDURE — 87389 HIV-1 AG W/HIV-1&-2 AB AG IA: CPT | Performed by: NURSE PRACTITIONER

## 2024-02-27 PROCEDURE — 99215 OFFICE O/P EST HI 40 MIN: CPT | Mod: PBBFAC,25 | Performed by: NURSE PRACTITIONER

## 2024-02-27 PROCEDURE — 3074F SYST BP LT 130 MM HG: CPT | Mod: CPTII,,, | Performed by: NURSE PRACTITIONER

## 2024-02-27 PROCEDURE — 85025 COMPLETE CBC W/AUTO DIFF WBC: CPT | Performed by: NURSE PRACTITIONER

## 2024-02-27 PROCEDURE — 1159F MED LIST DOCD IN RCRD: CPT | Mod: CPTII,,, | Performed by: NURSE PRACTITIONER

## 2024-02-27 PROCEDURE — 81001 URINALYSIS AUTO W/SCOPE: CPT | Performed by: NURSE PRACTITIONER

## 2024-02-27 PROCEDURE — 3008F BODY MASS INDEX DOCD: CPT | Mod: CPTII,,, | Performed by: NURSE PRACTITIONER

## 2024-02-27 PROCEDURE — 83036 HEMOGLOBIN GLYCOSYLATED A1C: CPT | Performed by: NURSE PRACTITIONER

## 2024-02-27 PROCEDURE — 90715 TDAP VACCINE 7 YRS/> IM: CPT | Mod: PBBFAC

## 2024-02-27 PROCEDURE — 86803 HEPATITIS C AB TEST: CPT | Performed by: NURSE PRACTITIONER

## 2024-02-27 PROCEDURE — 90471 IMMUNIZATION ADMIN: CPT | Mod: PBBFAC

## 2024-02-27 PROCEDURE — 84443 ASSAY THYROID STIM HORMONE: CPT | Performed by: NURSE PRACTITIONER

## 2024-02-27 PROCEDURE — 80053 COMPREHEN METABOLIC PANEL: CPT | Performed by: NURSE PRACTITIONER

## 2024-02-27 PROCEDURE — 1160F RVW MEDS BY RX/DR IN RCRD: CPT | Mod: CPTII,,, | Performed by: NURSE PRACTITIONER

## 2024-02-27 RX ORDER — MELOXICAM 15 MG/1
15 TABLET ORAL DAILY PRN
Qty: 30 TABLET | Refills: 1 | Status: SHIPPED | OUTPATIENT
Start: 2024-02-27

## 2024-02-27 RX ORDER — HYDROCORTISONE 25 MG/G
1 CREAM TOPICAL 2 TIMES DAILY
COMMUNITY
Start: 2023-11-08

## 2024-02-27 RX ADMIN — TETANUS TOXOID, REDUCED DIPHTHERIA TOXOID AND ACELLULAR PERTUSSIS VACCINE, ADSORBED 0.5 ML: 5; 2.5; 8; 8; 2.5 SUSPENSION INTRAMUSCULAR at 03:02

## 2024-02-27 NOTE — PROGRESS NOTES
Patient Name: Rubio Cheek   : 1980  MRN: [id number]     SUBJECTIVE DATA:    CHIEF COMPLAINT:   Rubio Cheek is a 44 y.o. male who presents to clinic today with Annual Exam        HPI:  44-year-old male presents to the clinic to complete his yearly wellness exam and also like to discuss right wrist pain.    Rt wrist:  Patient state he works as an .  He is right-handed.  When using the screwdriver he can feel the tightness and discomfort.  Patient state when he is off of work for 2 days the symptoms resolves.  On assessment nontender with palpation.  Patient report extension of the rest can cause discomfort.  Tinel and Phalen exam negative.  No crepitus or edema noted.  Rest, ice, elevate.  Rx Mobic 15 mg p.o. as needed for pain.  Take with food.  OTC Wrist sleeve for comfort.  Utilized search to show patient different types of wrist sleeves.  Stay hydrated with water.  Questions solicited and answered, patient verbalized and agreed to plan.    Hypertension:  Controlled.  Current blood pressure 129/83.  Patient state when he is tired after work he checks his blood pressure at home and that is elevated in the 150s top number and 90s bottom number.  Patient currently on Diovan 40 mg p.o. once daily.  Discussed with patient blood pressure checks at home if it continue to be elevated to notify the clinic for medication adjustment.  Continue with current treatment at this time.  Questions solicited and answered, patient verbalized and agreed to plan.    Social Determinants of Health     Tobacco Use: Low Risk  (2024)    Patient History     Smoking Tobacco Use: Never     Smokeless Tobacco Use: Never     Passive Exposure: Not on file   Alcohol Use: Not At Risk (2023)    AUDIT-C     Frequency of Alcohol Consumption: 2-4 times a month     Average Number of Drinks: 1 or 2     Frequency of Binge Drinking: Never   Financial Resource Strain: Low Risk  (2023)    Overall Financial Resource  Strain (CARDIA)     Difficulty of Paying Living Expenses: Not hard at all   Food Insecurity: No Food Insecurity (8/14/2023)    Hunger Vital Sign     Worried About Running Out of Food in the Last Year: Never true     Ran Out of Food in the Last Year: Never true   Transportation Needs: No Transportation Needs (8/14/2023)    PRAPARE - Transportation     Lack of Transportation (Medical): No     Lack of Transportation (Non-Medical): No   Physical Activity: Insufficiently Active (8/14/2023)    Exercise Vital Sign     Days of Exercise per Week: 2 days     Minutes of Exercise per Session: 30 min   Stress: Stress Concern Present (8/14/2023)    [employer] [place] [employer] - Occupational Stress Questionnaire     Feeling of Stress : To some extent   Social Connections: Socially Integrated (8/14/2023)    Social Connection and Isolation Panel [NHANES]     Frequency of Communication with Friends and Family: Twice a week     Frequency of Social Gatherings with Friends and Family: Twice a week     Attends Rastafari Services: 1 to 4 times per year     Active Member of Clubs or Organizations: No     Attends Club or Organization Meetings: 1 to 4 times per year     Marital Status:    Housing Stability: Low Risk  (8/14/2023)    Housing Stability Vital Sign     Unable to Pay for Housing in the Last Year: No     Number of Places Lived in the Last Year: 1     Unstable Housing in the Last Year: No   Depression: Low Risk  (2/27/2024)    Depression     Last PHQ-4: Flowsheet Data: 0     Car safety:  Seat belt used all the time.  Water:  Stay hydrated with fluids, drink 6-8 cups of water daily.  Alcohol:  Drink in moderation.  Exercise: exercise 30 minutes a day up to 5 days a week.  Stay active.  Lose weight.  Nutrition:  Follow low-cholesterol, low-fat, low-salt diet.  Eat fresh fruits and vegetables.  Keep in mind portion size.  Dental:  Patient does not follow-up with a dentist yearly.  Dentist list provided for patient  "to call and schedule  Ophthalmology:  Patient does not follow-up with ophthalmologist yearly.  Ophthalmology referral initiated  Patient completed colorectal cancer screening.  Immunizations:  Tdap.    Lab work drawn today will notify of test results when they become available.     Patient denies chest pain, shortness of breath, dyspnea on exertion, palpitations, peripheral edema, abdominal pain, nausea, vomiting, diarrhea, constipation, fatigue, fever, chills, dysuria,  hematuria, melena, or hematochezia.      ALLERGIES: Review of patient's allergies indicates:  No Known Allergies      ROS:  Review of Systems   Musculoskeletal:  Positive for joint pain (Right wrist).   All other systems reviewed and are negative.        OBJECTIVE DATA:  Vital signs  Vitals:    02/27/24 1457   BP: 129/83   Pulse: 63   Resp: 18   Temp: 98.1 °F (36.7 °C)   TempSrc: Oral   SpO2: 98%   Weight: 102.5 kg (226 lb)   Height: 5' 10" (1.778 m)      Body mass index is 32.43 kg/m².    PHYSICAL EXAM:   Physical Exam  Vitals and nursing note reviewed.   Constitutional:       General: He is awake. He is not in acute distress.     Appearance: Normal appearance. He is well-developed. He is obese. He is not ill-appearing, toxic-appearing or diaphoretic.   HENT:      Head: Normocephalic and atraumatic.      Right Ear: Tympanic membrane, ear canal and external ear normal.      Left Ear: Tympanic membrane, ear canal and external ear normal.      Nose: Nose normal. No congestion or rhinorrhea.      Mouth/Throat:      Lips: Pink.      Mouth: Mucous membranes are moist.      Dentition: Normal dentition.      Tongue: No lesions. Tongue does not deviate from midline.      Palate: No mass and lesions.      Pharynx: Oropharynx is clear. Uvula midline.      Comments: Dentist list provided for patient to call and schedule.  Eyes:      General: Lids are normal. No scleral icterus.     Extraocular Movements: Extraocular movements intact.      Conjunctiva/sclera: " Conjunctivae normal.      Pupils: Pupils are equal, round, and reactive to light.      Visual Fields: Right eye visual fields normal and left eye visual fields normal.      Comments: Vision Screening  Right eye - Without correction: 20/13  With correction:   Left eye - Without correction: 20/13  With correction:   Both eyes - Without correction: 20/10  With correction:        Neck:      Thyroid: No thyroid mass, thyromegaly or thyroid tenderness.      Trachea: Trachea and phonation normal.   Cardiovascular:      Rate and Rhythm: Normal rate and regular rhythm.      Pulses: Normal pulses.           Carotid pulses are 2+ on the right side and 2+ on the left side.       Radial pulses are 2+ on the right side and 2+ on the left side.        Femoral pulses are 2+ on the right side and 2+ on the left side.       Dorsalis pedis pulses are 2+ on the right side and 2+ on the left side.        Posterior tibial pulses are 2+ on the right side and 2+ on the left side.      Heart sounds: Normal heart sounds. No murmur heard.  Pulmonary:      Effort: Pulmonary effort is normal.      Breath sounds: Normal breath sounds and air entry. No wheezing or rhonchi.   Abdominal:      General: Bowel sounds are normal. There is no distension.      Palpations: Abdomen is soft. There is no mass.      Tenderness: There is no abdominal tenderness. There is no right CVA tenderness, left CVA tenderness, guarding or rebound.      Hernia: No hernia is present. There is no hernia in the left inguinal area or right inguinal area.   Genitourinary:     Pubic Area: No rash.       Penis: Normal and uncircumcised.       Testes: Normal. Cremasteric reflex is present.      Epididymis:      Right: Normal.      Left: Normal.   Musculoskeletal:         General: Tenderness present. Normal range of motion.      Cervical back: Full passive range of motion without pain, normal range of motion and neck supple. No rigidity or tenderness.      Right lower leg: No  edema.      Left lower leg: No edema.   Lymphadenopathy:      Cervical: No cervical adenopathy.      Lower Body: No right inguinal adenopathy. No left inguinal adenopathy.   Skin:     General: Skin is warm.      Capillary Refill: Capillary refill takes less than 2 seconds.      Findings: No rash.   Neurological:      General: No focal deficit present.      Mental Status: He is alert and oriented to person, place, and time. Mental status is at baseline.      GCS: GCS eye subscore is 4. GCS verbal subscore is 5. GCS motor subscore is 6.      Cranial Nerves: Cranial nerves 2-12 are intact. No cranial nerve deficit.      Sensory: Sensation is intact. No sensory deficit.      Motor: Motor function is intact. No weakness.      Coordination: Coordination is intact. Coordination normal.      Gait: Gait is intact. Gait normal.   Psychiatric:         Attention and Perception: Attention and perception normal.         Mood and Affect: Mood and affect normal.         Speech: Speech normal.         Behavior: Behavior normal. Behavior is cooperative.         Thought Content: Thought content normal.         Cognition and Memory: Cognition and memory normal.         Judgment: Judgment normal.          ASSESSMENT/PLAN:  1. Annual physical exam  -     CBC Auto Differential  -     Comprehensive Metabolic Panel  -     TSH  -     Lipid Panel  -     Hemoglobin A1C  -     Cancel: POCT Urinalysis, Dipstick, Automated, W/O Scope  -     Hepatitis C Antibody  -     HIV 1/2 Ag/Ab (4th Gen)  -     Ambulatory referral/consult to Ophthalmology; Future; Expected date: 03/05/2024  -     Tdap (BOOSTRIX) vaccine injection 0.5 mL  -     Urinalysis, Reflex to Urine Culture    2. Primary hypertension  Assessment & Plan:   Controlled.  Current blood pressure 129/83.  Patient state when he is tired after work he checks his blood pressure at home and that is elevated in the 150s top number and 90s bottom number.  Patient currently on Diovan 40 mg p.o. once  daily.  Discussed with patient blood pressure checks at home if it continue to be elevated to notify the clinic for medication adjustment.  Continue with current treatment at this time.  Questions solicited and answered, patient verbalized and agreed to plan.      3. Right wrist pain  Assessment & Plan:  Patient state he works as an .  He is right-handed.  When using the screwdriver he can feel the tightness and discomfort.  Patient state when he is off of work for 2 days the symptoms resolves.  On assessment nontender with palpation.  Patient report extension of the rest can cause discomfort.  Tinel and Phalen exam negative.  No crepitus or edema noted.  Rest, ice, elevate.  Rx Mobic 15 mg p.o. as needed for pain.  Take with food.  OTC Wrist sleeve for comfort.  Utilized search to show patient different types of wrist sleeves.  Stay hydrated with water.  Questions solicited and answered, patient verbalized and agreed to plan.    Orders:  -     meloxicam (MOBIC) 15 MG tablet; Take 1 tablet (15 mg total) by mouth daily as needed for Pain.  Dispense: 30 tablet; Refill: 1    4. BMI 32.0-32.9,adult  Assessment & Plan:  -2 g a day dietary sodium restriction  --control high blood pressure (goal blood pressure is less than 130/80, patient was advised to check blood pressure once or twice a week and bring blood pressure logs to next office visit)  -exercise at least 30 minutes a day, 5 days a week  -maintain healthy weight  -decrease or stop alcohol use  -do not smoke  -stay well hydrated (drink water only, avoid juices, sweet tea, and sodas)  -ask about staying up-to-date on vaccinations (flu vaccine, pneumonia vaccine, hepatitis B vaccine)  -avoid excessive use of NSAIDs (ibuprofen, naproxen, Aleve, Advil, Toradol, Mobic), take Tylenol as needed for headache or mild pain  -take cholesterol lowering medications if prescribed (LDL goal less than 100).  -lifestyle modifications and healthy habits.      Orders:  -      Hemoglobin A1C           RESULTS:  Recent Results (from the past 1008 hour(s))   Helicobacter Pylori Antigen Fecal EIA    Collection Time: 02/08/24  7:15 AM   Result Value Ref Range    H. pylori Stool Negative          Follow Up:  Follow up in about 3 months (around 5/27/2024).      Previous medical history/lab work/radiology reviewed and considered during medical management decisions.   Medication list reviewed and medication reconciliation performed.  Patient was provided  and care about his/her current diagnosis (es) and medications including risk/benefit and side effects/adverse events, over the counter medication uses/doses, home self-care and contact precautions,  and red flags and indications for when to seek immediate medical attention.   Patient was advised to continue compliance with current medication list and medical recommendations.  Patient dvised continued compliance with recommended eating habits/ diets for medical conditions and exercise 150 minutes/ week (if possible) for medical condition (s).  Educational handouts and instructions on selected disease management in AVS (After Visit Summary).    All of the patient's questions were answered to patient's satisfaction.   The patient was receptive, expressed verbal understanding and agreement the above plan.          This note was created with the assistance of a voice recognition software or phone dictation. There may be transcription errors as a result of using this technology however minimal. Effort has been made to assure accuracy of transcription but any obvious errors or omissions should be clarified with the author of the document

## 2024-02-27 NOTE — ASSESSMENT & PLAN NOTE
Controlled.  Current blood pressure 129/83.  Patient state when he is tired after work he checks his blood pressure at home and that is elevated in the 150s top number and 90s bottom number.  Patient currently on Diovan 40 mg p.o. once daily.  Discussed with patient blood pressure checks at home if it continue to be elevated to notify the clinic for medication adjustment.  Continue with current treatment at this time.  Questions solicited and answered, patient verbalized and agreed to plan.

## 2024-02-27 NOTE — ASSESSMENT & PLAN NOTE
-2 g a day dietary sodium restriction  --control high blood pressure (goal blood pressure is less than 130/80, patient was advised to check blood pressure once or twice a week and bring blood pressure logs to next office visit)  -exercise at least 30 minutes a day, 5 days a week  -maintain healthy weight  -decrease or stop alcohol use  -do not smoke  -stay well hydrated (drink water only, avoid juices, sweet tea, and sodas)  -ask about staying up-to-date on vaccinations (flu vaccine, pneumonia vaccine, hepatitis B vaccine)  -avoid excessive use of NSAIDs (ibuprofen, naproxen, Aleve, Advil, Toradol, Mobic), take Tylenol as needed for headache or mild pain  -take cholesterol lowering medications if prescribed (LDL goal less than 100).  -lifestyle modifications and healthy habits.

## 2024-02-27 NOTE — ASSESSMENT & PLAN NOTE
Patient state he works as an .  He is right-handed.  When using the screwdriver he can feel the tightness and discomfort.  Patient state when he is off of work for 2 days the symptoms resolves.  On assessment nontender with palpation.  Patient report extension of the rest can cause discomfort.  Tinel and Phalen exam negative.  No crepitus or edema noted.  Rest, ice, elevate.  Rx Mobic 15 mg p.o. as needed for pain.  Take with food.  OTC Wrist sleeve for comfort.  Utilized search to show patient different types of wrist sleeves.  Stay hydrated with water.  Questions solicited and answered, patient verbalized and agreed to plan.

## 2024-02-28 NOTE — PROGRESS NOTES
PLEASE CALL PATIENTS WITH RESULTS,   Blood work within acceptable range.  Urinalysis no sign of infection.  Blood count no sign of anemia.  White blood cells count decreased slightly and that is due to his viral infection.  Chemistry, electrolytes, kidney liver function within normal range.  Thyroid function test normal.  Hepatitis-C and HIV no infection.    Cholesterol within acceptable range keep up the good work.  No signs of diabetes or prediabetes.  Stay physically active, lose weight.  Call the clinic if you have any questions or concerns.

## 2024-02-29 ENCOUNTER — TELEPHONE (OUTPATIENT)
Dept: FAMILY MEDICINE | Facility: CLINIC | Age: 44
End: 2024-02-29
Payer: MEDICAID

## 2024-02-29 NOTE — TELEPHONE ENCOUNTER
----- Message from MADDY Egan sent at 2/28/2024 12:04 PM CST -----  PLEASE CALL PATIENTS WITH RESULTS,   Blood work within acceptable range.  Urinalysis no sign of infection.  Blood count no sign of anemia.  White blood cells count decreased slightly and that is due to his viral infection.  Chemistry, electrolytes, kidney liver function within normal range.  Thyroid function test normal.  Hepatitis-C and HIV no infection.    Cholesterol within acceptable range keep up the good work.  No signs of diabetes or prediabetes.  Stay physically active, lose weight.  Call the clinic if you have any questions or concerns.

## 2024-03-01 ENCOUNTER — TELEPHONE (OUTPATIENT)
Dept: FAMILY MEDICINE | Facility: CLINIC | Age: 44
End: 2024-03-01
Payer: MEDICAID

## 2024-03-01 NOTE — TELEPHONE ENCOUNTER
----- Message from Zeyad Redd LPN sent at 3/1/2024  2:32 PM CST -----    ----- Message -----  From: Justin Villagomez FNP  Sent: 2/28/2024  12:04 PM CST  To: Zeyad Redd LPN    Informed patient that Blood work within acceptable range .Urinalysis no sign of infection.  Blood count no sign of anemia.  White blood cells count decreased slightly and that is due to his viral infection.  Chemistry, electrolytes, kidney liver function within normal range.  Thyroid function test normal.  Hepatitis-C and HIV no infection.    Cholesterol within acceptable range keep up the good work.  No signs of diabetes or prediabetes.  Stay physically active, lose weight.  Call the clinic if you have any questions or concerns.    Patient voiced understanding about results

## 2024-06-03 PROBLEM — Z00.00 ANNUAL PHYSICAL EXAM: Status: RESOLVED | Noted: 2024-02-27 | Resolved: 2024-06-03

## 2024-08-07 ENCOUNTER — OFFICE VISIT (OUTPATIENT)
Dept: FAMILY MEDICINE | Facility: CLINIC | Age: 44
End: 2024-08-07
Payer: MEDICAID

## 2024-08-07 VITALS
BODY MASS INDEX: 32.21 KG/M2 | RESPIRATION RATE: 18 BRPM | DIASTOLIC BLOOD PRESSURE: 83 MMHG | HEIGHT: 70 IN | WEIGHT: 225 LBS | SYSTOLIC BLOOD PRESSURE: 131 MMHG | HEART RATE: 73 BPM | OXYGEN SATURATION: 98 % | TEMPERATURE: 98 F

## 2024-08-07 DIAGNOSIS — I10 PRIMARY HYPERTENSION: Primary | ICD-10-CM

## 2024-08-07 DIAGNOSIS — K21.9 GASTROESOPHAGEAL REFLUX DISEASE, UNSPECIFIED WHETHER ESOPHAGITIS PRESENT: ICD-10-CM

## 2024-08-07 PROCEDURE — 3044F HG A1C LEVEL LT 7.0%: CPT | Mod: CPTII,,, | Performed by: NURSE PRACTITIONER

## 2024-08-07 PROCEDURE — 99214 OFFICE O/P EST MOD 30 MIN: CPT | Mod: PBBFAC | Performed by: NURSE PRACTITIONER

## 2024-08-07 PROCEDURE — 1159F MED LIST DOCD IN RCRD: CPT | Mod: CPTII,,, | Performed by: NURSE PRACTITIONER

## 2024-08-07 PROCEDURE — 3075F SYST BP GE 130 - 139MM HG: CPT | Mod: CPTII,,, | Performed by: NURSE PRACTITIONER

## 2024-08-07 PROCEDURE — 1160F RVW MEDS BY RX/DR IN RCRD: CPT | Mod: CPTII,,, | Performed by: NURSE PRACTITIONER

## 2024-08-07 PROCEDURE — 99214 OFFICE O/P EST MOD 30 MIN: CPT | Mod: S$PBB,,, | Performed by: NURSE PRACTITIONER

## 2024-08-07 PROCEDURE — 3079F DIAST BP 80-89 MM HG: CPT | Mod: CPTII,,, | Performed by: NURSE PRACTITIONER

## 2024-08-07 PROCEDURE — 3008F BODY MASS INDEX DOCD: CPT | Mod: CPTII,,, | Performed by: NURSE PRACTITIONER

## 2024-08-07 RX ORDER — AMLODIPINE BESYLATE 10 MG/1
10 TABLET ORAL DAILY
Qty: 30 TABLET | Refills: 3 | Status: SHIPPED | OUTPATIENT
Start: 2024-08-07

## 2024-08-07 RX ORDER — OMEPRAZOLE 40 MG/1
40 CAPSULE, DELAYED RELEASE ORAL EVERY MORNING
Qty: 30 CAPSULE | Refills: 5 | Status: SHIPPED | OUTPATIENT
Start: 2024-08-07

## 2024-08-07 RX ORDER — FAMOTIDINE 40 MG/1
40 TABLET, FILM COATED ORAL NIGHTLY
COMMUNITY
Start: 2024-05-12 | End: 2024-08-07

## 2024-08-12 ENCOUNTER — DOCUMENTATION ONLY (OUTPATIENT)
Dept: FAMILY MEDICINE | Facility: CLINIC | Age: 44
End: 2024-08-12
Payer: MEDICAID

## 2025-01-25 DIAGNOSIS — K21.9 GASTROESOPHAGEAL REFLUX DISEASE, UNSPECIFIED WHETHER ESOPHAGITIS PRESENT: ICD-10-CM

## 2025-01-28 RX ORDER — OMEPRAZOLE 40 MG/1
40 CAPSULE, DELAYED RELEASE ORAL EVERY MORNING
Qty: 30 CAPSULE | Refills: 5 | Status: SHIPPED | OUTPATIENT
Start: 2025-01-28 | End: 2026-01-28

## 2025-05-13 ENCOUNTER — OFFICE VISIT (OUTPATIENT)
Dept: OPHTHALMOLOGY | Facility: CLINIC | Age: 45
End: 2025-05-13
Payer: MEDICAID

## 2025-05-13 VITALS — WEIGHT: 225.06 LBS | HEIGHT: 70 IN | BODY MASS INDEX: 32.22 KG/M2

## 2025-05-13 DIAGNOSIS — H52.4 PRESBYOPIA: ICD-10-CM

## 2025-05-13 DIAGNOSIS — H50.15 ALTERNATING EXOTROPIA: ICD-10-CM

## 2025-05-13 DIAGNOSIS — E11.9 DIABETES MELLITUS WITHOUT OPHTHALMIC MANIFESTATIONS: Primary | ICD-10-CM

## 2025-05-13 DIAGNOSIS — I10 PRIMARY HYPERTENSION: ICD-10-CM

## 2025-05-13 PROCEDURE — 92134 CPTRZ OPH DX IMG PST SGM RTA: CPT | Mod: PBBFAC,PN

## 2025-05-13 PROCEDURE — 99213 OFFICE O/P EST LOW 20 MIN: CPT | Mod: PBBFAC,PN

## 2025-05-13 NOTE — PROGRESS NOTES
HPI    New Patient Exam, DFE//Refraction  Last edited by Jaye Duran on 5/13/2025  2:26 PM.            Assessment /Plan     For exam results, see Encounter Report.    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 DM/HTN w/o Retinopathy, OU  - Last A1c 5.3 from 02/27/2024  - Encouraged good BG/HTN control  - 5/13/25: No retinopathy on exam, OCTm w/o DME, Optos photos taken   - Recommend annual DFE (next due 05/2026)    2. Alt XT  - Pt reports present since childhood,  - no surgeries, no amblyopia (20/20 ou)  - no diplopia   - CTM     3. Presbyopia  - trouble w/ small print   - OTC readers prn      RTC 1 year for annual DFE, sooner PRN

## 2025-05-15 PROBLEM — H50.15 ALTERNATING EXOTROPIA: Status: ACTIVE | Noted: 2025-05-15
